# Patient Record
Sex: FEMALE | URBAN - METROPOLITAN AREA
[De-identification: names, ages, dates, MRNs, and addresses within clinical notes are randomized per-mention and may not be internally consistent; named-entity substitution may affect disease eponyms.]

---

## 2024-05-13 ENCOUNTER — APPOINTMENT (OUTPATIENT)
Dept: URBAN - METROPOLITAN AREA CLINIC 256 | Age: 24
Setting detail: DERMATOLOGY
End: 2024-05-13

## 2024-05-13 VITALS — HEIGHT: 69 IN | WEIGHT: 200 LBS

## 2024-05-13 DIAGNOSIS — L259 CONTACT DERMATITIS AND OTHER ECZEMA, UNSPECIFIED CAUSE: ICD-10-CM

## 2024-05-13 PROBLEM — L23.9 ALLERGIC CONTACT DERMATITIS, UNSPECIFIED CAUSE: Status: ACTIVE | Noted: 2024-05-13

## 2024-05-13 PROCEDURE — OTHER MIPS QUALITY: OTHER

## 2024-05-13 PROCEDURE — OTHER ADDITIONAL NOTES: OTHER

## 2024-05-13 PROCEDURE — OTHER COUNSELING: OTHER

## 2024-05-13 PROCEDURE — 99203 OFFICE O/P NEW LOW 30 MIN: CPT

## 2024-05-13 NOTE — HPI: NAIL DISORDER
Is This A New Presentation, Or A Follow-Up?: Nail Disorder
Additional History: Patient states her PCP prescribed her terbinafine 200mg and she is on the 2nd month. She states OTC creams did not work. She also states she has not had any testing done for fungus for her nails.\\n\\nPatient denies a history of psoriasis.\\n\\nPatient states she had ringworm on her shoulder and it went away with topical prescriptions. Patient states she just uses nail polish and has not used acrylic nails in years.

## 2024-05-13 NOTE — PROCEDURE: ADDITIONAL NOTES
Render Risk Assessment In Note?: no
Detail Level: Simple
Additional Notes: - Discussed with patient discontinue terbinafine as this is not fungus.\\n- Discussed with patient not using nail polish or anything on the nails (pt also states she uses Vitamin E). Discussed it can take up to 4-6 months to see the nails clear.\\n- Do not favor psoriasis or fungus as the toenails are not affected. Patient states she does not use polish on her toenails. Discussed avoiding polish on the toenails as well.\\n- Discussed a referral for allergy testing if nails do not resolve after avoiding nail products.\\n- Can use Vaseline on the nails. \\n- Keep hands dry, advised to wear gloves when doing dishes or other activities where she could be in contact with chemicals or irritants.\\n-Extended visit and discussion